# Patient Record
Sex: MALE
[De-identification: names, ages, dates, MRNs, and addresses within clinical notes are randomized per-mention and may not be internally consistent; named-entity substitution may affect disease eponyms.]

---

## 2020-01-31 ENCOUNTER — HOSPITAL ENCOUNTER (INPATIENT)
Dept: HOSPITAL 93 - ER | Age: 40
LOS: 21 days | Discharge: HOME | DRG: 391 | End: 2020-02-21
Attending: INTERNAL MEDICINE | Admitting: INTERNAL MEDICINE
Payer: COMMERCIAL

## 2020-01-31 VITALS — BODY MASS INDEX: 17.16 KG/M2 | HEIGHT: 75 IN | WEIGHT: 138 LBS

## 2020-01-31 DIAGNOSIS — F43.12: ICD-10-CM

## 2020-01-31 DIAGNOSIS — K57.20: Primary | ICD-10-CM

## 2020-01-31 DIAGNOSIS — N20.0: ICD-10-CM

## 2020-01-31 DIAGNOSIS — K65.1: ICD-10-CM

## 2020-01-31 DIAGNOSIS — R31.29: ICD-10-CM

## 2020-01-31 DIAGNOSIS — K50.00: ICD-10-CM

## 2020-01-31 PROCEDURE — BW21YZZ COMPUTERIZED TOMOGRAPHY (CT SCAN) OF ABDOMEN AND PELVIS USING OTHER CONTRAST: ICD-10-PCS | Performed by: INTERNAL MEDICINE

## 2020-01-31 NOTE — NUR
PT ALERTA Y ORIENTADO X3 ESFERAS SE LE ORIENTA SOBRE [place] Y REFIERE ENTEDER. SE
MELODIE MUESTRAS DE NA Y VENOPUNCION CON TECNICAS ASEPTICAS. SE ADMINSITRAN
MEDICAMENTOS ORDENADOS. PT [place].

## 2020-02-01 PROCEDURE — 3E0336Z INTRODUCTION OF NUTRITIONAL SUBSTANCE INTO PERIPHERAL VEIN, PERCUTANEOUS APPROACH: ICD-10-PCS | Performed by: INTERNAL MEDICINE

## 2020-02-06 PROCEDURE — 02HV33Z INSERTION OF INFUSION DEVICE INTO SUPERIOR VENA CAVA, PERCUTANEOUS APPROACH: ICD-10-PCS | Performed by: INTERNAL MEDICINE

## 2020-02-07 PROCEDURE — BW21Y0Z COMPUTERIZED TOMOGRAPHY (CT SCAN) OF ABDOMEN AND PELVIS USING OTHER CONTRAST, UNENHANCED AND ENHANCED: ICD-10-PCS | Performed by: INTERNAL MEDICINE

## 2020-02-12 PROCEDURE — 0W9F30Z DRAINAGE OF ABDOMINAL WALL WITH DRAINAGE DEVICE, PERCUTANEOUS APPROACH: ICD-10-PCS | Performed by: RADIOLOGY

## 2020-03-03 ENCOUNTER — HOSPITAL ENCOUNTER (INPATIENT)
Dept: HOSPITAL 93 - O/R | Age: 40
LOS: 29 days | Discharge: HOME | DRG: 331 | End: 2020-04-01
Attending: COLON & RECTAL SURGERY | Admitting: COLON & RECTAL SURGERY
Payer: COMMERCIAL

## 2020-03-03 VITALS — WEIGHT: 127 LBS | BODY MASS INDEX: 15.79 KG/M2 | HEIGHT: 75 IN

## 2020-03-03 DIAGNOSIS — F41.9: ICD-10-CM

## 2020-03-03 DIAGNOSIS — K57.30: Primary | ICD-10-CM

## 2020-03-03 DIAGNOSIS — Z20.828: ICD-10-CM

## 2020-03-03 DIAGNOSIS — E16.2: ICD-10-CM

## 2020-03-03 DIAGNOSIS — R50.9: ICD-10-CM

## 2020-03-03 DIAGNOSIS — F43.12: ICD-10-CM

## 2020-03-03 DIAGNOSIS — I10: ICD-10-CM

## 2020-03-25 PROCEDURE — 0DBP4ZZ EXCISION OF RECTUM, PERCUTANEOUS ENDOSCOPIC APPROACH: ICD-10-PCS | Performed by: COLON & RECTAL SURGERY

## 2020-03-25 PROCEDURE — 0DBN4ZZ EXCISION OF SIGMOID COLON, PERCUTANEOUS ENDOSCOPIC APPROACH: ICD-10-PCS | Performed by: COLON & RECTAL SURGERY

## 2020-03-25 PROCEDURE — 0DJD8ZZ INSPECTION OF LOWER INTESTINAL TRACT, VIA NATURAL OR ARTIFICIAL OPENING ENDOSCOPIC: ICD-10-PCS | Performed by: COLON & RECTAL SURGERY

## 2020-03-30 PROCEDURE — 8E0ZXY6 ISOLATION: ICD-10-PCS | Performed by: COLON & RECTAL SURGERY

## 2021-09-16 ENCOUNTER — HOSPITAL ENCOUNTER (OUTPATIENT)
Dept: HOSPITAL 93 - AMB-ENDOS | Age: 41
Discharge: HOME | End: 2021-09-16
Attending: COLON & RECTAL SURGERY
Payer: COMMERCIAL

## 2021-09-16 DIAGNOSIS — Z20.822: ICD-10-CM

## 2021-09-16 DIAGNOSIS — K64.3: ICD-10-CM

## 2021-09-16 DIAGNOSIS — K62.89: Primary | ICD-10-CM

## 2023-02-15 ENCOUNTER — HOSPITAL ENCOUNTER (OUTPATIENT)
Dept: HOSPITAL 93 - CIR.AMB | Age: 43
Discharge: HOME | End: 2023-02-15
Attending: SURGERY
Payer: COMMERCIAL

## 2023-02-15 VITALS — WEIGHT: 139 LBS | BODY MASS INDEX: 17.28 KG/M2 | HEIGHT: 75 IN

## 2023-02-15 DIAGNOSIS — Z20.822: ICD-10-CM

## 2023-02-15 DIAGNOSIS — Z88.6: ICD-10-CM

## 2023-02-15 DIAGNOSIS — K40.90: Primary | ICD-10-CM

## 2023-02-15 PROCEDURE — 49650 LAP ING HERNIA REPAIR INIT: CPT
